# Patient Record
Sex: MALE | Race: WHITE | NOT HISPANIC OR LATINO | Employment: OTHER | URBAN - METROPOLITAN AREA
[De-identification: names, ages, dates, MRNs, and addresses within clinical notes are randomized per-mention and may not be internally consistent; named-entity substitution may affect disease eponyms.]

---

## 2018-03-20 ENCOUNTER — OFFICE VISIT (OUTPATIENT)
Dept: FAMILY MEDICINE | Facility: CLINIC | Age: 53
End: 2018-03-20
Payer: COMMERCIAL

## 2018-03-20 VITALS
HEART RATE: 72 BPM | SYSTOLIC BLOOD PRESSURE: 146 MMHG | BODY MASS INDEX: 30.1 KG/M2 | HEIGHT: 69 IN | DIASTOLIC BLOOD PRESSURE: 86 MMHG | WEIGHT: 203.25 LBS

## 2018-03-20 DIAGNOSIS — R73.03 PRE-DIABETES: ICD-10-CM

## 2018-03-20 DIAGNOSIS — I10 ESSENTIAL HYPERTENSION: ICD-10-CM

## 2018-03-20 DIAGNOSIS — F11.21 HISTORY OF NARCOTIC ADDICTION: ICD-10-CM

## 2018-03-20 DIAGNOSIS — Z00.00 PREVENTATIVE HEALTH CARE: Primary | ICD-10-CM

## 2018-03-20 DIAGNOSIS — Z72.0 TOBACCO ABUSE: ICD-10-CM

## 2018-03-20 DIAGNOSIS — E29.1 HYPOGONADISM IN MALE: ICD-10-CM

## 2018-03-20 PROCEDURE — 90471 IMMUNIZATION ADMIN: CPT | Mod: S$GLB,,, | Performed by: INTERNAL MEDICINE

## 2018-03-20 PROCEDURE — 99999 PR PBB SHADOW E&M-EST. PATIENT-LVL III: CPT | Mod: PBBFAC,,, | Performed by: INTERNAL MEDICINE

## 2018-03-20 PROCEDURE — 99396 PREV VISIT EST AGE 40-64: CPT | Mod: 25,S$GLB,, | Performed by: INTERNAL MEDICINE

## 2018-03-20 PROCEDURE — 90715 TDAP VACCINE 7 YRS/> IM: CPT | Mod: S$GLB,,, | Performed by: INTERNAL MEDICINE

## 2018-03-20 RX ORDER — TESTOSTERONE CYPIONATE 1000 MG/10ML
200 INJECTION, SOLUTION INTRAMUSCULAR WEEKLY
COMMUNITY

## 2018-03-20 NOTE — PATIENT INSTRUCTIONS
Pneumovax (pneumococcal pneumonia vaccine) is the vaccine we talked about.     Shingrix is the name of the new shingles vaccine that I also recommend.    Keep a log of your blood pressure 1-2 times a day until you see Dr. Frazier next month.   Work on avoiding the extra salt.

## 2018-03-20 NOTE — PROGRESS NOTES
Assessment and Plan:    1. Preventative health care  Discussed age appropriate preventative healthcare items including avoidance of tobacco, excessive alcohol consumption, and illicit drugs. Discussed safe sex practices. Discussed appropriate use of sunscreen, seatbelts, etc. Discussed maintenance of a healthy weight.   Still smoking and BP not at goal, but patient has lost a lot of weight since last visit here.   Due for Tdap, pneumovax, and Shingrix. Patient was amenable to Tdap today, and will think about the others.   Due for colon cancer screening and amenable to colonoscopy.   - Case request GI: COLONOSCOPY  - Hemoglobin A1c; Future  - Comprehensive metabolic panel; Future  - Lipid panel; Future  - CBC auto differential; Future  - TSH; Future  - Tdap Vaccine    2. Pre-diabetes  A1c 5.9, but has lost weight and started significantly more exercise and low card diet since then. Will repeat A1c.   - Hemoglobin A1c; Future    3. Essential hypertension  Not at goal. Discussed stopping the pre-workout salt load and keeping BP log until he sees Dr. Frazier next month.     4. History of narcotic addiction  Sees psych for management of buprenorphine.    5. Tobacco abuse  Continues to smoke, planning to quit cold turkey.     6. Hypogonadism in male  Sees urology for testosterone management. Taking rather high dose.       ______________________________________________________________________  Subjective:    Chief Complaint:  Establish care    HPI:  Wing is a 52 y.o. year old man here to establish care.     He has been seeing Dr. Frazier from Cardiology for management of HTN. He has not been checking BP regularly at home. Continues to take coreg 6.25 BID, did take it already today. Notes that he has been taking Himalayan pink sea salt pre-workouts.     He has recently lost 80 lbs with diet and exercise. Has been using a ketogenic diet. Notes that he has been going to the gyn regularly.     He continues to smoke.     He  notes that he has been testing blood sugar at home, and before he had lost the weight he was having some elevated blood sugar, but has not been having this recently.     He sees Urology (Dr. Ortiz) for management of hypogonadism and erectile dysfunction. Uses testosterone weekly and ciaslis PRN.    Past Medical History:  Past Medical History:   Diagnosis Date    History of narcotic addiction     Hypertension     FRANCES on CPAP     Pre-diabetes     Tobacco abuse        Past Surgical History:  Past Surgical History:   Procedure Laterality Date    KNEE ARTHROSCOPY Right 2015    outside provider       Family History:  Family History   Problem Relation Age of Onset    No Known Problems Mother     Pancreatic cancer Father        Social History:  Social History     Social History    Marital status:      Spouse name: N/A    Number of children: N/A    Years of education: N/A     Social History Main Topics    Smoking status: Current Every Day Smoker     Years: 20.00     Types: Cigarettes    Smokeless tobacco: Never Used    Alcohol use No    Drug use: No    Sexual activity: Yes     Partners: Female     Other Topics Concern    None     Social History Narrative    None       Medications:      Current Outpatient Prescriptions:     buprenorphine HCl 8 mg Subl, Place 1 tablet under the tongue nightly as needed., Disp: , Rfl: 0    carisoprodol (SOMA) 350 MG tablet, Take 1 tablet by mouth nightly as needed., Disp: , Rfl: 0    carvedilol (COREG) 6.25 MG tablet, Take 6.25 mg by mouth 2 (two) times daily. , Disp: , Rfl: 12    CIALIS 5 mg tablet, Take 1 tablet by mouth as needed., Disp: , Rfl: 0    testosterone cypionate (DEPOTESTOTERONE CYPIONATE) 100 mg/mL injection, Inject 200 mg into the muscle once a week., Disp: , Rfl:     PROAIR HFA 90 mcg/actuation inhaler, Inhale 2 puffs into the lungs as needed., Disp: , Rfl: 1      Allergies:  Patient has no known allergies.    Immunizations:  Immunization History  "  Administered Date(s) Administered    Tdap 03/20/2018       Review of Systems:  Review of Systems   Constitutional: Negative for chills and fever.   Respiratory: Negative for shortness of breath and wheezing.    Cardiovascular: Negative for chest pain and leg swelling.   Gastrointestinal: Negative for constipation and diarrhea.   Neurological: Negative for seizures and syncope.       Objective:    Vitals:  Vitals:    03/20/18 0924   BP: (!) 146/86   Pulse: 72   Weight: 92.2 kg (203 lb 4.2 oz)   Height: 5' 9" (1.753 m)   PainSc: 0-No pain       Physical Exam   Constitutional: He is oriented to person, place, and time. He appears well-developed and well-nourished. No distress.   HENT:   Mouth/Throat: Oropharynx is clear and moist.   Eyes: No scleral icterus.   Cardiovascular: Normal rate and regular rhythm.    No murmur heard.  Pulmonary/Chest: Effort normal and breath sounds normal. No respiratory distress. He has no wheezes.   Musculoskeletal: He exhibits no edema.   Neurological: He is alert and oriented to person, place, and time.   Skin: Skin is warm and dry.   Psychiatric: He has a normal mood and affect. His behavior is normal.   Vitals reviewed.      Data:    Labs reviewed, notable for A1c 5.9, good lipid profile    Lia Weems MD  Internal Medicine    "

## 2018-07-26 ENCOUNTER — TELEPHONE (OUTPATIENT)
Dept: ENDOCRINOLOGY | Facility: CLINIC | Age: 53
End: 2018-07-26

## 2019-01-20 PROBLEM — D75.1 POLYCYTHEMIA, SECONDARY: Status: ACTIVE | Noted: 2019-01-20

## 2019-01-20 PROBLEM — D75.1 POLYCYTHEMIA: Status: ACTIVE | Noted: 2019-01-20

## 2019-01-20 PROBLEM — D75.1 POLYCYTHEMIA SECONDARY TO SMOKING: Status: ACTIVE | Noted: 2019-01-20

## 2019-01-21 ENCOUNTER — OFFICE VISIT (OUTPATIENT)
Dept: HEMATOLOGY/ONCOLOGY | Facility: CLINIC | Age: 54
End: 2019-01-21
Payer: COMMERCIAL

## 2019-01-21 VITALS
TEMPERATURE: 99 F | SYSTOLIC BLOOD PRESSURE: 152 MMHG | WEIGHT: 215.38 LBS | HEART RATE: 83 BPM | RESPIRATION RATE: 20 BRPM | DIASTOLIC BLOOD PRESSURE: 81 MMHG | BODY MASS INDEX: 31.81 KG/M2

## 2019-01-21 DIAGNOSIS — T38.7X5A ADVERSE EFFECT OF TESTOSTERONE, INITIAL ENCOUNTER: ICD-10-CM

## 2019-01-21 DIAGNOSIS — D75.1 POLYCYTHEMIA, SECONDARY: ICD-10-CM

## 2019-01-21 DIAGNOSIS — D75.1 POLYCYTHEMIA SECONDARY TO SMOKING: ICD-10-CM

## 2019-01-21 DIAGNOSIS — D75.1 POLYCYTHEMIA: ICD-10-CM

## 2019-01-21 PROCEDURE — 3008F BODY MASS INDEX DOCD: CPT | Mod: ,,, | Performed by: INTERNAL MEDICINE

## 2019-01-21 PROCEDURE — 99203 OFFICE O/P NEW LOW 30 MIN: CPT | Mod: ,,, | Performed by: INTERNAL MEDICINE

## 2019-01-21 PROCEDURE — 3077F PR MOST RECENT SYSTOLIC BLOOD PRESSURE >= 140 MM HG: ICD-10-PCS | Mod: ,,, | Performed by: INTERNAL MEDICINE

## 2019-01-21 PROCEDURE — 3079F PR MOST RECENT DIASTOLIC BLOOD PRESSURE 80-89 MM HG: ICD-10-PCS | Mod: ,,, | Performed by: INTERNAL MEDICINE

## 2019-01-21 PROCEDURE — 3077F SYST BP >= 140 MM HG: CPT | Mod: ,,, | Performed by: INTERNAL MEDICINE

## 2019-01-21 PROCEDURE — 3008F PR BODY MASS INDEX (BMI) DOCUMENTED: ICD-10-PCS | Mod: ,,, | Performed by: INTERNAL MEDICINE

## 2019-01-21 PROCEDURE — 3079F DIAST BP 80-89 MM HG: CPT | Mod: ,,, | Performed by: INTERNAL MEDICINE

## 2019-01-21 PROCEDURE — 99203 PR OFFICE/OUTPT VISIT, NEW, LEVL III, 30-44 MIN: ICD-10-PCS | Mod: ,,, | Performed by: INTERNAL MEDICINE

## 2019-01-21 NOTE — LETTER
January 21, 2019      Lia Weems MD  [address]  [place] [address]           [facility] - Hematology Oncology  [address]  [address]  [facility] [place] [address]  Phone: [phone]  Fax: [phone]          Patient: Wing Gurrola   MR Number: [id number]   YOB: 1965   Date of Visit: 1/21/2019       Dear Dr. Lia Weems:    Thank you for referring Wing Gurrola to me for evaluation. Attached you will find relevant portions of my assessment and plan of care.    If you have questions, please do not hesitate to call me. I look forward to following Wing Gurrola along with you.    Sincerely,    Evan Menard MD    Enclosure  CC:  No Recipients    If you would like to receive this communication electronically, please contact [email or web address] or [phone] to request more information on [employer] Link access.    For providers and/or their staff who would like to refer a patient to [facility], please contact us through our one-stop-shop provider referral line, [facility], at [phone].    If you feel you have received this communication in error or would no longer like to receive these types of communications, please e-mail [email or web address]

## 2019-01-21 NOTE — PROGRESS NOTES
[facility] Hematolgy/Oncology  History & Physical    Subjective:      Patient ID:   NAME: Wing Gurrola : 1965     53 y.o. male    Referring Doc: Anirudh Ortiz ()  Other Physicians: Lia Weems        Chief Complaint: polycythemia      HPI:  53 y.o. male with diagnosis of polycythemia who has been referred by Dr Anirudh Ortiz with  for evaluation by medical hematology/oncology. He is here by himself. He was once over 300 lbs and has been on ketogenic diet and has since lost 110 lbs. He is working out, etc. He is in testosterone and is trying to quit smoking. He denies any CP, SOB, HA's or N/V. He is a retired  and works at the [employer].               ROS:   GEN: normal without any fever, night sweats or weight loss  HEENT: normal with no HA's, sore throat, stiff neck, changes in vision  CV: normal with no CP, SOB, PND, CALLOWAY or orthopnea  PULM: normal with no SOB, cough, hemoptysis, sputum or pleuritic pain  GI: normal with no abdominal pain, nausea, vomiting, constipation, diarrhea, melanotic stools, BRBPR, or hematemesis  : normal with no hematuria, dysuria  BREAST: normal with no mass, discharge, pain  SKIN: normal with no rash, erythema, bruising, or swelling       Past Medical/Surgical History:  Past Medical History:   Diagnosis Date    Adverse effect of testosterone 2019    Erectile dysfunction     History of narcotic addiction     Hypertension     Hypogonadism in male     FRANCES on CPAP     Polycythemia 2019    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    Polycythemia, secondary 2019    Pre-diabetes     Tobacco abuse      Past Surgical History:   Procedure Laterality Date    KNEE ARTHROSCOPY Right     outside provider         Allergies:  Review of patient's allergies indicates:  No Known Allergies    Social/Family History:  Social History     Socioeconomic History    Marital status:      Spouse name: Not on file    Number of children: Not on  file    Years of education: Not on file    Highest education level: Not on file   Social Needs    Financial resource strain: Not on file    Food insecurity - worry: Not on file    Food insecurity - inability: Not on file    Transportation needs - medical: Not on file    Transportation needs - non-medical: Not on file   Occupational History    Not on file   Tobacco Use    Smoking status: Current Every Day Smoker     Years: 20.00     Types: Cigarettes    Smokeless tobacco: Never Used   Substance and Sexual Activity    Alcohol use: No     Alcohol/week: 0.0 oz    Drug use: No    Sexual activity: Yes     Partners: Female   Other Topics Concern    Not on file   Social History Narrative    Not on file     Family History   Problem Relation Age of Onset    No Known Problems Mother     Pancreatic cancer Father          Medications:    Current Outpatient Medications:     buprenorphine HCl 8 mg Subl, Place 1 tablet under the tongue nightly as needed., Disp: , Rfl: 0    carisoprodol (SOMA) 350 MG tablet, Take 1 tablet by mouth nightly as needed., Disp: , Rfl: 0    carvedilol (COREG) 6.25 MG tablet, Take 6.25 mg by mouth 2 (two) times daily. , Disp: , Rfl: 12    CIALIS 5 mg tablet, Take 1 tablet by mouth as needed., Disp: , Rfl: 0    PROAIR HFA 90 mcg/actuation inhaler, Inhale 2 puffs into the lungs as needed., Disp: , Rfl: 1    testosterone cypionate (DEPOTESTOTERONE CYPIONATE) 100 mg/mL injection, Inject 200 mg into the muscle once a week., Disp: , Rfl:       Pathology:  Cancer Staging  No matching staging information was found for the patient.      Objective:   Vitals:  Blood pressure (!) 152/81, pulse 83, temperature 98.6 °F (37 °C), resp. rate 20, weight 97.7 kg (215 lb 6.4 oz).    Physical Examination:   GEN: no apparent distress, comfortable; AAOx3  HEAD: atraumatic and normocephalic  EYES: no pallor, no icterus, PERRLA  ENT: OMM, no pharyngeal erythema, external ears WNL; no nasal discharge; no  thrush  NECK: no masses, thyroid normal, trachea midline, no LAD/LN's, supple  CV: RRR with no murmur; normal pulse; normal S1 and S2; no pedal edema  CHEST: Normal respiratory effort; CTAB; normal breath sounds; no wheeze or crackles  ABDOM: nontender and nondistended; soft; normal bowel sounds; no rebound/guarding  MUSC/Skeletal: ROM normal; no crepitus; joints normal; no deformities or arthropathy  EXTREM: no clubbing, cyanosis, inflammation or swelling  SKIN: no rashes, lesions, ulcers, petechiae or subcutaneous nodules; tattoos   : no brown  NEURO: grossly intact; motor/sensory WNL; AAOx3; no tremors  PSYCH: normal mood, affect and behavior  LYMPH: normal cervical, supraclavicular, axillary and groin LN's      Labs:     11/12/2018  hgb 19.1 and Hct  57.5  PSA 0.4      1/9/2018 on chart      Radiology/Diagnostic Studies:          All lab results and imaging results have been reviewed and discussed with the patient    Assessment:   (1) 53 y.o. male with diagnosis of polycythemia who has been referred by Dr Anirudh Ortiz with  for evaluation by medical hematology/oncology.  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 Check US liver and spleen, CXR  2. Check ABG, LDH, retic, erythropoiten level, and JAK2  3. Recommend tobacco cessation and reduction in dosing of testosterone if not complete discontinuation  4. F/u with PCP and   RTC in  3-4 weeks can cancel; RTC 3 month  keep  Fax note to Faustina Ortiz      I have explained and the patient understands all of  the current recommendation(s). I have answered all of their questions to the best of my ability and to their complete satisfaction.             Thank you for allowing me to participate in this patient's care. Please call with any questions or concerns.    Electronically signed Evan Menard MD

## 2019-01-31 LAB
ALBUMIN SERPL-MCNC: 4.3 G/DL (ref 3.1–4.7)
ALP SERPL-CCNC: 41 IU/L (ref 40–104)
ALT (SGPT): 27 IU/L (ref 3–33)
AST SERPL-CCNC: 32 IU/L (ref 10–40)
BASOPHILS NFR BLD: 0.1 K/UL (ref 0–0.2)
BASOPHILS NFR BLD: 0.6 %
BILIRUB SERPL-MCNC: 1.2 MG/DL (ref 0.3–1)
BUN SERPL-MCNC: 21 MG/DL (ref 8–20)
CALCIUM SERPL-MCNC: 9.4 MG/DL (ref 7.7–10.4)
CHLORIDE: 100 MMOL/L (ref 98–110)
CO2 SERPL-SCNC: 26.9 MMOL/L (ref 22.8–31.6)
CREATININE: 1.16 MG/DL (ref 0.6–1.4)
EOSINOPHIL NFR BLD: 0.1 K/UL (ref 0–0.7)
EOSINOPHIL NFR BLD: 1.4 %
ERYTHROCYTE [DISTWIDTH] IN BLOOD BY AUTOMATED COUNT: 13.7 % (ref 11.7–14.9)
GLUCOSE: 122 MG/DL (ref 70–99)
GRAN #: 5.4 K/UL (ref 1.4–6.5)
GRAN%: 66.2 %
HCT VFR BLD AUTO: 52.7 % (ref 39–55)
HGB BLD-MCNC: 18.1 G/DL (ref 14–16)
IMMATURE GRANS (ABS): 0 K/UL (ref 0–1)
IMMATURE GRANULOCYTES: 0.5 %
LDH SERPL L TO P-CCNC: 149 IU/L (ref 100–194)
LYMPH #: 1.5 K/UL (ref 1.2–3.4)
LYMPH%: 18 %
MCH RBC QN AUTO: 33.3 PG (ref 25–35)
MCHC RBC AUTO-ENTMCNC: 34.3 G/DL (ref 31–36)
MCV RBC AUTO: 97.1 FL (ref 80–100)
MONO #: 1.1 K/UL (ref 0.1–0.6)
MONO%: 13.3 %
NUCLEATED RBCS: 0 %
PLATELET # BLD AUTO: 205 K/UL (ref 140–440)
PMV BLD AUTO: 9.9 FL (ref 8.8–12.7)
POTASSIUM SERPL-SCNC: 4.1 MMOL/L (ref 3.5–5)
PROT SERPL-MCNC: 7.1 G/DL (ref 6–8.2)
RBC # BLD AUTO: 5.43 M/UL (ref 4.3–5.9)
SODIUM: 137 MMOL/L (ref 134–144)
WBC # BLD AUTO: 8.1 K/UL (ref 5–10)

## 2019-02-08 LAB — MISCELLANEOUS LAB TEST ORDER: NORMAL

## 2019-03-26 ENCOUNTER — TELEPHONE (OUTPATIENT)
Dept: FAMILY MEDICINE | Facility: CLINIC | Age: 54
End: 2019-03-26

## 2019-03-26 NOTE — TELEPHONE ENCOUNTER
----- Message from Zain Alvarez sent at 3/26/2019  1:47 PM CDT -----  Type:  Patient Returning Call    Who Called:  Patient  Who Left Message for Patient:  Graciela  Does the patient know what this is regarding?:    Best Call Back Number:  [phone]  Additional Information:

## 2019-05-17 ENCOUNTER — TELEPHONE (OUTPATIENT)
Dept: HEMATOLOGY/ONCOLOGY | Facility: CLINIC | Age: 54
End: 2019-05-17

## 2019-05-17 DIAGNOSIS — D75.1 POLYCYTHEMIA: Primary | ICD-10-CM

## 2019-05-17 DIAGNOSIS — T38.7X5A ADVERSE EFFECT OF TESTOSTERONE, INITIAL ENCOUNTER: ICD-10-CM

## 2019-08-01 ENCOUNTER — PATIENT OUTREACH (OUTPATIENT)
Dept: ADMINISTRATIVE | Facility: HOSPITAL | Age: 54
End: 2019-08-01

## 2019-08-01 NOTE — LETTER
August 1, 2019    Wing Gurrola  [address]  [place] [address]             [facility]  [address]  [facility] [address]  Phone: [phone] Dear Mr. Gurrola:    [facility] is committed to your overall health.  To help you get the most out of each of your visits, we will review your information to make sure you are up to date on all of your recommended tests and/or procedures.      Lia Weems MD  has found that your chart shows you may be due for the following:    Health Maintenance Due   Topic    Pneumococcal Vaccine (Medium Risk) (1 of 1 - PPSV23)    Colonoscopy        If you have had any of the above done at another facility, please bring the records with you or FAX them to [phone] so that your record at [facility] will be complete.  If you have not had any of these tests or procedures done recently and would like to complete this testing, please call [phone] or send a message through your [facility] portal to your provider's office.    If you have an upcoming scheduled appointment for the above test and/or procedures, please disregard this letter.      If you have any questions or concerns, please don't hesitate to call.    Sincerely,        Meredith Smith MA

## 2020-07-10 ENCOUNTER — TELEPHONE (OUTPATIENT)
Dept: HEMATOLOGY/ONCOLOGY | Facility: CLINIC | Age: 55
End: 2020-07-10

## 2020-07-10 NOTE — TELEPHONE ENCOUNTER
Patient called today because he has been sitting at the blood center to get phlebotomized and they have no orders.  I instructed him that he has missed several appointments with Dr. Menard.  I instructed him the last appointment he made was January 2019.  I instructed him that he will need a new appointment before we can give any orders.  Olga gave me Monday at 1100.  Patient said that he will take that.

## 2020-07-12 NOTE — PROGRESS NOTES
[facility] Hematology/Oncology  PROGRESS NOTE - 2nd Follow-up Visit      Subjective:       Patient ID:   NAME: Wing Gurrola : 1965     54 y.o. male    Referring Doc: Anirudh Ortiz ()  Other Physicians: Lia White    Chief Complaint:  polycythemia    History of Present Illness:     Patient returns today for a 2nd regularly scheduled follow-up visit. He last showed up for appointment in 2019.  The patient is here today to go over the results of the recently ordered labs, tests and studies. He is here by himself. He had some recently anxiety issues and had panic attack. He saw Dr White with cardiology recently with good report. Breathing ok. No CP, SOB, HA's or N/V. He has been active and is working out at gym.    He had his last phlebotomy in 2020    Discussed covid19 precautions            ROS:   GEN: normal without any fever, night sweats or weight loss  HEENT: normal with no HA's, sore throat, stiff neck, changes in vision  CV: normal with no CP, SOB, PND, CALLOWAY or orthopnea  PULM: normal with no SOB, cough, hemoptysis, sputum or pleuritic pain  GI: normal with no abdominal pain, nausea, vomiting, constipation, diarrhea, melanotic stools, BRBPR, or hematemesis  : normal with no hematuria, dysuria  BREAST: normal with no mass, discharge, pain  SKIN: normal with no rash, erythema, bruising, or swelling    Pain Scale:  0    Allergies:  Review of patient's allergies indicates:  No Known Allergies    Medications:    Current Outpatient Medications:     buprenorphine HCl 8 mg Subl, Place 1 tablet under the tongue nightly as needed., Disp: , Rfl: 0    CIALIS 5 mg tablet, Take 1 tablet by mouth as needed., Disp: , Rfl: 0    testosterone cypionate (DEPOTESTOTERONE CYPIONATE) 100 mg/mL injection, Inject 200 mg into the muscle once a week., Disp: , Rfl:     carisoprodol (SOMA) 350 MG tablet, Take 1 tablet by mouth nightly as needed., Disp: , Rfl: 0    carvedilol (COREG) 6.25 MG tablet, Take  6.25 mg by mouth 2 (two) times daily. , Disp: , Rfl: 12    neomycin-polymyxin-hydrocortisone (CORTISPORIN) 3.5-10,000-1 mg/mL-unit/mL-% otic suspension, Place 4 drops into the left ear 3 (three) times daily., Disp: 10 mL, Rfl: 0    PROAIR HFA 90 mcg/actuation inhaler, Inhale 2 puffs into the lungs as needed., Disp: , Rfl: 1    PMHx/PSHx Updates:  See patient's last visit with me on 1/21/2019.  See H&P on 1/21/2019        Pathology:  Cancer Staging  No matching staging information was found for the patient.          Objective:     Vitals:  Blood pressure 124/74, pulse 86, temperature 97.5 °F (36.4 °C), resp. rate 19, weight 96.7 kg (213 lb 3.2 oz).    Physical Examination:   GEN: no apparent distress, comfortable; AAOx3  HEAD: atraumatic and normocephalic  EYES: no pallor, no icterus, PERRLA  ENT: OMM, no pharyngeal erythema, external ears WNL; no nasal discharge; no thrush  NECK: no masses, thyroid normal, trachea midline, no LAD/LN's, supple  CV: RRR with no murmur; normal pulse; normal S1 and S2; no pedal edema  CHEST: Normal respiratory effort; CTAB; normal breath sounds; no wheeze or crackles  ABDOM: nontender and nondistended; soft; normal bowel sounds; no rebound/guarding  MUSC/Skeletal: ROM normal; no crepitus; joints normal; no deformities or arthropathy  EXTREM: no clubbing, cyanosis, inflammation or swelling  SKIN: no rashes, lesions, ulcers, petechiae or subcutaneous nodules;  tattoos   : no brown  NEURO: grossly intact; motor/sensory WNL; AAOx3; no tremors  PSYCH: normal mood, affect and behavior  LYMPH: normal cervical, supraclavicular, axillary and groin LN's            Labs:     pending      Radiology/Diagnostic Studies:    No results found.    I have reviewed all available lab results and radiology reports.    Assessment/Plan:   (1) 54 y.o. male with diagnosis of polycythemia who has been referred by Dr Anirudh Ortiz with  for evaluation by medical hematology/oncology.  - most likely a  secondary process due to chronic tobacco use, testosterone supplementation and FRANCES  - he has been getting phlebotomies every 3 months and last had it done in march 2020  - he has not had any complete blood work done since Jan 2019     (2) Hx/of narcotic abuse        (3) HTN and pre-diabetic     (4) Chronic tobacco use     (5) FRANCES and obesity - on CPAP     (6) Hx/of hematuria and prostatitis, hypogonadism, and ED - followed by Dr Ortiz with      (7) Lost 110#'s over 2 yrs on ketogenic diet - he was over 300lbs at one time    VISIT DIAGNOSES:      Polycythemia, secondary    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 Set up basic blood work and encouraged him to have it done every 3 months  2. phlebomize 500cc every 3 months as needed per parameters  3. F/u with PCP CArd, AP, etc  4. RTC in 12 months  Fax note to Alesia White, and Angel    Discussion:     COVID-19 Discussion:    I had long discussion with patient and any applicable family about the COVID-19 coronavirus epidemic and the recommended precautions with regard to cancer and/or hematology patients. I have re-iterated the CDC recommendations for adequate hand washing, use of hand -like products, and coughing into elbow, etc. In addition, especially for our patients who are on chemotherapy and/or our otherwise immunocompromised patients, I have recommended avoidance of crowds, including movie theaters, restaurants, churches, etc. I have recommended avoidance of any sick or symptomatic family members and/or friends. I have also recommended avoidance of any raw and unwashed food products, and general avoidance of food items that have not been prepared by themselves. The patient has been asked to call us immediately with any symptom developments, issues, questions or other general concerns.     I spent over 25 mins of time with the patient. Reviewed  results of the recently ordered labs, tests and studies; made directives with regards to the results. Over half of this time was spent couseling and coordinating care.    I have explained all of the above in detail and the patient understands all of the current recommendation(s). I have answered all of their questions to the best of my ability and to their complete satisfaction.   The patient is to continue with the current management plan.            Electronically signed by Evan Menard MD

## 2020-07-13 ENCOUNTER — OFFICE VISIT (OUTPATIENT)
Dept: HEMATOLOGY/ONCOLOGY | Facility: CLINIC | Age: 55
End: 2020-07-13
Payer: COMMERCIAL

## 2020-07-13 VITALS
TEMPERATURE: 98 F | SYSTOLIC BLOOD PRESSURE: 124 MMHG | RESPIRATION RATE: 19 BRPM | HEART RATE: 86 BPM | DIASTOLIC BLOOD PRESSURE: 74 MMHG | BODY MASS INDEX: 31.48 KG/M2 | WEIGHT: 213.19 LBS

## 2020-07-13 DIAGNOSIS — G47.33 OSA ON CPAP: ICD-10-CM

## 2020-07-13 DIAGNOSIS — D75.1 POLYCYTHEMIA: ICD-10-CM

## 2020-07-13 DIAGNOSIS — D75.1 POLYCYTHEMIA, SECONDARY: Primary | ICD-10-CM

## 2020-07-13 DIAGNOSIS — D75.1 POLYCYTHEMIA SECONDARY TO SMOKING: ICD-10-CM

## 2020-07-13 DIAGNOSIS — Z72.0 TOBACCO ABUSE: ICD-10-CM

## 2020-07-13 DIAGNOSIS — F11.21 HISTORY OF NARCOTIC ADDICTION: ICD-10-CM

## 2020-07-13 PROCEDURE — 99215 OFFICE O/P EST HI 40 MIN: CPT | Mod: S$GLB,,, | Performed by: INTERNAL MEDICINE

## 2020-07-13 PROCEDURE — 3008F BODY MASS INDEX DOCD: CPT | Mod: S$GLB,,, | Performed by: INTERNAL MEDICINE

## 2020-07-13 PROCEDURE — 3008F PR BODY MASS INDEX (BMI) DOCUMENTED: ICD-10-PCS | Mod: S$GLB,,, | Performed by: INTERNAL MEDICINE

## 2020-07-13 PROCEDURE — 3078F DIAST BP <80 MM HG: CPT | Mod: S$GLB,,, | Performed by: INTERNAL MEDICINE

## 2020-07-13 PROCEDURE — 3078F PR MOST RECENT DIASTOLIC BLOOD PRESSURE < 80 MM HG: ICD-10-PCS | Mod: S$GLB,,, | Performed by: INTERNAL MEDICINE

## 2020-07-13 PROCEDURE — 99215 PR OFFICE/OUTPT VISIT, EST, LEVL V, 40-54 MIN: ICD-10-PCS | Mod: S$GLB,,, | Performed by: INTERNAL MEDICINE

## 2020-07-13 PROCEDURE — 3074F PR MOST RECENT SYSTOLIC BLOOD PRESSURE < 130 MM HG: ICD-10-PCS | Mod: S$GLB,,, | Performed by: INTERNAL MEDICINE

## 2020-07-13 PROCEDURE — 3074F SYST BP LT 130 MM HG: CPT | Mod: S$GLB,,, | Performed by: INTERNAL MEDICINE
